# Patient Record
Sex: FEMALE | Race: WHITE | NOT HISPANIC OR LATINO | URBAN - METROPOLITAN AREA
[De-identification: names, ages, dates, MRNs, and addresses within clinical notes are randomized per-mention and may not be internally consistent; named-entity substitution may affect disease eponyms.]

---

## 2018-03-10 ENCOUNTER — EMERGENCY (EMERGENCY)
Facility: HOSPITAL | Age: 63
LOS: 1 days | Discharge: HOME | End: 2018-03-10
Attending: EMERGENCY MEDICINE | Admitting: EMERGENCY MEDICINE
Payer: COMMERCIAL

## 2018-03-10 VITALS
SYSTOLIC BLOOD PRESSURE: 125 MMHG | HEART RATE: 62 BPM | RESPIRATION RATE: 20 BRPM | DIASTOLIC BLOOD PRESSURE: 60 MMHG | OXYGEN SATURATION: 100 %

## 2018-03-10 VITALS
DIASTOLIC BLOOD PRESSURE: 79 MMHG | HEART RATE: 58 BPM | RESPIRATION RATE: 18 BRPM | SYSTOLIC BLOOD PRESSURE: 121 MMHG | OXYGEN SATURATION: 98 % | TEMPERATURE: 98 F

## 2018-03-10 LAB
ALBUMIN SERPL ELPH-MCNC: 3.7 G/DL — SIGNIFICANT CHANGE UP (ref 3.3–5)
ALP SERPL-CCNC: 73 U/L — SIGNIFICANT CHANGE UP (ref 40–120)
ALT FLD-CCNC: 18 U/L RC — SIGNIFICANT CHANGE UP (ref 10–45)
ANION GAP SERPL CALC-SCNC: 13 MMOL/L — SIGNIFICANT CHANGE UP (ref 5–17)
AST SERPL-CCNC: 21 U/L — SIGNIFICANT CHANGE UP (ref 10–40)
BASOPHILS # BLD AUTO: 0.1 K/UL — SIGNIFICANT CHANGE UP (ref 0–0.2)
BASOPHILS NFR BLD AUTO: 0.6 % — SIGNIFICANT CHANGE UP (ref 0–2)
BILIRUB SERPL-MCNC: 0.2 MG/DL — SIGNIFICANT CHANGE UP (ref 0.2–1.2)
BUN SERPL-MCNC: 13 MG/DL — SIGNIFICANT CHANGE UP (ref 7–23)
CALCIUM SERPL-MCNC: 8.4 MG/DL — SIGNIFICANT CHANGE UP (ref 8.4–10.5)
CHLORIDE SERPL-SCNC: 104 MMOL/L — SIGNIFICANT CHANGE UP (ref 96–108)
CO2 SERPL-SCNC: 23 MMOL/L — SIGNIFICANT CHANGE UP (ref 22–31)
CREAT SERPL-MCNC: 0.64 MG/DL — SIGNIFICANT CHANGE UP (ref 0.5–1.3)
EOSINOPHIL # BLD AUTO: 0.2 K/UL — SIGNIFICANT CHANGE UP (ref 0–0.5)
EOSINOPHIL NFR BLD AUTO: 1.7 % — SIGNIFICANT CHANGE UP (ref 0–6)
GLUCOSE SERPL-MCNC: 109 MG/DL — HIGH (ref 70–99)
HCT VFR BLD CALC: 38.2 % — SIGNIFICANT CHANGE UP (ref 34.5–45)
HGB BLD-MCNC: 12.6 G/DL — SIGNIFICANT CHANGE UP (ref 11.5–15.5)
LYMPHOCYTES # BLD AUTO: 2.8 K/UL — SIGNIFICANT CHANGE UP (ref 1–3.3)
LYMPHOCYTES # BLD AUTO: 25.1 % — SIGNIFICANT CHANGE UP (ref 13–44)
MCHC RBC-ENTMCNC: 32.7 PG — SIGNIFICANT CHANGE UP (ref 27–34)
MCHC RBC-ENTMCNC: 32.8 GM/DL — SIGNIFICANT CHANGE UP (ref 32–36)
MCV RBC AUTO: 99.5 FL — SIGNIFICANT CHANGE UP (ref 80–100)
MONOCYTES # BLD AUTO: 0.8 K/UL — SIGNIFICANT CHANGE UP (ref 0–0.9)
MONOCYTES NFR BLD AUTO: 6.9 % — SIGNIFICANT CHANGE UP (ref 2–14)
NEUTROPHILS # BLD AUTO: 7.4 K/UL — SIGNIFICANT CHANGE UP (ref 1.8–7.4)
NEUTROPHILS NFR BLD AUTO: 65.7 % — SIGNIFICANT CHANGE UP (ref 43–77)
PLATELET # BLD AUTO: 258 K/UL — SIGNIFICANT CHANGE UP (ref 150–400)
POTASSIUM SERPL-MCNC: 3.8 MMOL/L — SIGNIFICANT CHANGE UP (ref 3.5–5.3)
POTASSIUM SERPL-SCNC: 3.8 MMOL/L — SIGNIFICANT CHANGE UP (ref 3.5–5.3)
PROT SERPL-MCNC: 6.4 G/DL — SIGNIFICANT CHANGE UP (ref 6–8.3)
RBC # BLD: 3.84 M/UL — SIGNIFICANT CHANGE UP (ref 3.8–5.2)
RBC # FLD: 11.6 % — SIGNIFICANT CHANGE UP (ref 10.3–14.5)
SODIUM SERPL-SCNC: 140 MMOL/L — SIGNIFICANT CHANGE UP (ref 135–145)
WBC # BLD: 11.3 K/UL — HIGH (ref 3.8–10.5)
WBC # FLD AUTO: 11.3 K/UL — HIGH (ref 3.8–10.5)

## 2018-03-10 PROCEDURE — 93005 ELECTROCARDIOGRAM TRACING: CPT

## 2018-03-10 PROCEDURE — 73110 X-RAY EXAM OF WRIST: CPT

## 2018-03-10 PROCEDURE — 85027 COMPLETE CBC AUTOMATED: CPT

## 2018-03-10 PROCEDURE — 99284 EMERGENCY DEPT VISIT MOD MDM: CPT | Mod: 25

## 2018-03-10 PROCEDURE — 80053 COMPREHEN METABOLIC PANEL: CPT

## 2018-03-10 PROCEDURE — 73110 X-RAY EXAM OF WRIST: CPT | Mod: 26,RT

## 2018-03-10 PROCEDURE — 93010 ELECTROCARDIOGRAM REPORT: CPT

## 2018-03-10 RX ORDER — SODIUM CHLORIDE 9 MG/ML
1000 INJECTION INTRAMUSCULAR; INTRAVENOUS; SUBCUTANEOUS ONCE
Qty: 0 | Refills: 0 | Status: COMPLETED | OUTPATIENT
Start: 2018-03-10 | End: 2018-03-10

## 2018-03-10 RX ADMIN — SODIUM CHLORIDE 1000 MILLILITER(S): 9 INJECTION INTRAMUSCULAR; INTRAVENOUS; SUBCUTANEOUS at 18:32

## 2018-03-10 NOTE — ED PROVIDER NOTE - PLAN OF CARE
1. Stay hydrated. Follow up with PCP within 24-48 hours.   2. Rest. Apply cold pack for 20 minutes multiple times daily. Elevate. Keep splint clean, dry, and in place.  3. Return to ER for new or worsened symptoms including severe pain, swelling, numbness/tingling, bluish discoloration, weakness,  or any concern.

## 2018-03-10 NOTE — ED PROVIDER NOTE - CARE PLAN
Principal Discharge DX:	Vasovagal syncope  Assessment and plan of treatment:	1. Stay hydrated. Follow up with PCP within 24-48 hours.   2. Rest. Apply cold pack for 20 minutes multiple times daily. Elevate. Keep splint clean, dry, and in place.  3. Return to ER for new or worsened symptoms including severe pain, swelling, numbness/tingling, bluish discoloration, weakness,  or any concern.

## 2018-03-10 NOTE — ED ADULT NURSE NOTE - OBJECTIVE STATEMENT
62 year old female presented to ED via EMS from Urgent Care with c/o of seizure activity. Pt went to Yoga class today and slipped and landed on right wrist. Pt went to Urgent Care to repair wrist and had 1 episode of focal seizure for 1 minute. EMS was called, 500cc bolus NS given via route, 18G placed in left hand via EMS. Pt denies CP, SOB, nausea/vomiting, numbness/tingling, fever, cough, chills, dizziness, headache. Pt a&ox3, lung sounds clear, heart rate regular, abdomen soft nontender nondistended to palp. Neuro intact. Skin intact. Pt currently resting in bed with family at bedside, side rails up for safety. Will continue to monitor and assess while offering support and reassurance.

## 2018-03-10 NOTE — ED PROVIDER NOTE - OBJECTIVE STATEMENT
63 Yo female Pmhx HLD presents to ED c/o syncope episode 15:30. Pt states 9AM, she was rushing to Yoga class, had mechanical fall placing her hands outward injuring her right wrist. Pt went home, had increasing pain in wrist and was driven to Urgent Care by son. While under examination by physician with palpation of right wrist, patient started to feel faint and syncope x 1 minute. Pt felt weak and lethargic afterwards. Pt right wrist splinted and no XR done. Ambulance was called and transported to ED, uneventful. Denies past h/o seizures, AMS, jerking of limbs, head trauma, CP, SOB, palpitations, diaphoresis.     PCP: Rupinder Lopez  Cardiologist Ezra Mena 63 Yo female Pmhx HLD presents to ED c/o syncope episode 15:30. Pt states 9AM, she was rushing to Yoga class, had mechanical fall placing her hands outward injuring her right wrist. Pt went home, had increasing pain in wrist and was driven to Urgent Care by son. While under examination by physician with palpation of right wrist, patient started to feel faint and syncope x 1 minute. Pt felt weak and lethargic afterwards. Pt right wrist splinted and no XR done. Ambulance was called and transported to ED, uneventful. Denies past h/o seizures, prodromal episode before syncope event, AMS, jerking of limbs, head trauma, CP, SOB, palpitations, diaphoresis.     PCP: Rupinder Lopez  Cardiologist Ezra Mena

## 2018-03-10 NOTE — ED ADULT NURSE NOTE - CHPI ED SYMPTOMS NEG
no nausea/no blurred vision/no loss of consciousness/no confusion/no weakness/no vomiting/no dizziness/no fever/no numbness/no change in level of consciousness

## 2018-03-11 ENCOUNTER — TRANSCRIPTION ENCOUNTER (OUTPATIENT)
Age: 63
End: 2018-03-11

## 2019-03-26 PROBLEM — Z00.00 ENCOUNTER FOR PREVENTIVE HEALTH EXAMINATION: Status: ACTIVE | Noted: 2019-03-26

## 2019-04-26 ENCOUNTER — APPOINTMENT (OUTPATIENT)
Dept: ENDOCRINOLOGY | Facility: CLINIC | Age: 64
End: 2019-04-26

## 2019-08-07 ENCOUNTER — APPOINTMENT (OUTPATIENT)
Dept: ENDOCRINOLOGY | Facility: CLINIC | Age: 64
End: 2019-08-07

## 2021-02-10 ENCOUNTER — NON-APPOINTMENT (OUTPATIENT)
Age: 66
End: 2021-02-10

## 2021-02-10 ENCOUNTER — APPOINTMENT (OUTPATIENT)
Dept: CARDIOLOGY | Facility: CLINIC | Age: 66
End: 2021-02-10
Payer: MEDICAID

## 2021-02-10 VITALS
DIASTOLIC BLOOD PRESSURE: 72 MMHG | HEIGHT: 60 IN | TEMPERATURE: 98.3 F | HEART RATE: 66 BPM | BODY MASS INDEX: 25.52 KG/M2 | SYSTOLIC BLOOD PRESSURE: 118 MMHG | OXYGEN SATURATION: 97 % | WEIGHT: 130 LBS

## 2021-02-10 DIAGNOSIS — I10 ESSENTIAL (PRIMARY) HYPERTENSION: ICD-10-CM

## 2021-02-10 DIAGNOSIS — Z78.9 OTHER SPECIFIED HEALTH STATUS: ICD-10-CM

## 2021-02-10 DIAGNOSIS — I65.29 OCCLUSION AND STENOSIS OF UNSPECIFIED CAROTID ARTERY: ICD-10-CM

## 2021-02-10 DIAGNOSIS — R53.83 OTHER FATIGUE: ICD-10-CM

## 2021-02-10 DIAGNOSIS — Z12.83 ENCOUNTER FOR SCREENING FOR MALIGNANT NEOPLASM OF SKIN: ICD-10-CM

## 2021-02-10 DIAGNOSIS — Z56.0 UNEMPLOYMENT, UNSPECIFIED: ICD-10-CM

## 2021-02-10 DIAGNOSIS — Z60.2 PROBLEMS RELATED TO LIVING ALONE: ICD-10-CM

## 2021-02-10 DIAGNOSIS — Z63.4 DISAPPEARANCE AND DEATH OF FAMILY MEMBER: ICD-10-CM

## 2021-02-10 DIAGNOSIS — Z82.3 FAMILY HISTORY OF STROKE: ICD-10-CM

## 2021-02-10 PROCEDURE — 99072 ADDL SUPL MATRL&STAF TM PHE: CPT

## 2021-02-10 PROCEDURE — 99213 OFFICE O/P EST LOW 20 MIN: CPT

## 2021-02-10 PROCEDURE — 93000 ELECTROCARDIOGRAM COMPLETE: CPT

## 2021-02-10 RX ORDER — OLMESARTAN MEDOXOMIL 20 MG/1
20 TABLET, FILM COATED ORAL
Qty: 90 | Refills: 1 | Status: ACTIVE | COMMUNITY
Start: 2021-02-10

## 2021-02-10 SDOH — SOCIAL STABILITY - SOCIAL INSECURITY: PROBLEMS RELATED TO LIVING ALONE: Z60.2

## 2021-02-10 SDOH — SOCIAL STABILITY - SOCIAL INSECURITY: DISSAPEARANCE AND DEATH OF FAMILY MEMBER: Z63.4

## 2021-02-10 SDOH — ECONOMIC STABILITY - INCOME SECURITY: UNEMPLOYMENT, UNSPECIFIED: Z56.0

## 2021-02-10 NOTE — HISTORY OF PRESENT ILLNESS
[FreeTextEntry1] : This is a 65 year old lady with a PMH of HTN and HLD presents today for cardiac evaluation and to establish care at our office. Patient states that she is feeling good and has no complaints at this time. Patient states that she suffers from Insomnia, however, she has addressed with PMD and states that it has improved.

## 2021-02-10 NOTE — PHYSICAL EXAM
[General Appearance - Well Developed] : well developed [Normal Appearance] : normal appearance [Well Groomed] : well groomed [General Appearance - Well Nourished] : well nourished [No Deformities] : no deformities [General Appearance - In No Acute Distress] : no acute distress [Normal Conjunctiva] : the conjunctiva exhibited no abnormalities [Eyelids - No Xanthelasma] : the eyelids demonstrated no xanthelasmas [Normal Oral Mucosa] : normal oral mucosa [No Oral Pallor] : no oral pallor [No Oral Cyanosis] : no oral cyanosis [Normal Jugular Venous A Waves Present] : normal jugular venous A waves present [Normal Jugular Venous V Waves Present] : normal jugular venous V waves present [No Jugular Venous Davis A Waves] : no jugular venous davis A waves [Heart Rate And Rhythm] : heart rate and rhythm were normal [Heart Sounds] : normal S1 and S2 [Respiration, Rhythm And Depth] : normal respiratory rhythm and effort [Exaggerated Use Of Accessory Muscles For Inspiration] : no accessory muscle use [Auscultation Breath Sounds / Voice Sounds] : lungs were clear to auscultation bilaterally [Abdomen Soft] : soft [Abdomen Tenderness] : non-tender [Abdomen Mass (___ Cm)] : no abdominal mass palpated [Abnormal Walk] : normal gait [Gait - Sufficient For Exercise Testing] : the gait was sufficient for exercise testing [Nail Clubbing] : no clubbing of the fingernails [Cyanosis, Localized] : no localized cyanosis [Petechial Hemorrhages (___cm)] : no petechial hemorrhages [Skin Color & Pigmentation] : normal skin color and pigmentation [] : no rash [No Venous Stasis] : no venous stasis [Skin Lesions] : no skin lesions [No Skin Ulcers] : no skin ulcer [No Xanthoma] : no  xanthoma was observed [Oriented To Time, Place, And Person] : oriented to person, place, and time [Affect] : the affect was normal [Mood] : the mood was normal [No Anxiety] : not feeling anxious [FreeTextEntry1] : 1/6 systolic murmur llsb

## 2021-02-25 ENCOUNTER — TRANSCRIPTION ENCOUNTER (OUTPATIENT)
Age: 66
End: 2021-02-25

## 2021-02-25 ENCOUNTER — APPOINTMENT (OUTPATIENT)
Dept: CARDIOLOGY | Facility: CLINIC | Age: 66
End: 2021-02-25
Payer: MEDICAID

## 2021-02-25 PROCEDURE — 99072 ADDL SUPL MATRL&STAF TM PHE: CPT

## 2021-02-25 PROCEDURE — 93880 EXTRACRANIAL BILAT STUDY: CPT

## 2021-02-25 PROCEDURE — 93306 TTE W/DOPPLER COMPLETE: CPT

## 2021-09-01 ENCOUNTER — NON-APPOINTMENT (OUTPATIENT)
Age: 66
End: 2021-09-01

## 2021-09-01 ENCOUNTER — APPOINTMENT (OUTPATIENT)
Dept: CARDIOLOGY | Facility: CLINIC | Age: 66
End: 2021-09-01
Payer: MEDICAID

## 2021-09-01 VITALS
WEIGHT: 129 LBS | HEART RATE: 68 BPM | SYSTOLIC BLOOD PRESSURE: 120 MMHG | OXYGEN SATURATION: 98 % | TEMPERATURE: 98.2 F | BODY MASS INDEX: 25.32 KG/M2 | DIASTOLIC BLOOD PRESSURE: 80 MMHG | HEIGHT: 60 IN

## 2021-09-01 DIAGNOSIS — Z71.89 OTHER SPECIFIED COUNSELING: ICD-10-CM

## 2021-09-01 DIAGNOSIS — I51.89 OTHER ILL-DEFINED HEART DISEASES: ICD-10-CM

## 2021-09-01 PROCEDURE — 93000 ELECTROCARDIOGRAM COMPLETE: CPT

## 2021-09-01 PROCEDURE — 99214 OFFICE O/P EST MOD 30 MIN: CPT

## 2021-09-01 NOTE — HISTORY OF PRESENT ILLNESS
[FreeTextEntry1] : pt presents  for f/u pt refered by pmd as pt noted to have cardiac mass on ct chest of unclear etiology .questionablrefat vs other .pt also with increased pericardial fluid .pt feels well pt with htn /dyslipidemia pt denies any chest  pain dizziness ,lightheadedness ,nausea vomiting diaphoresis\par

## 2021-09-15 ENCOUNTER — APPOINTMENT (OUTPATIENT)
Dept: CARDIOLOGY | Facility: CLINIC | Age: 66
End: 2021-09-15
Payer: MEDICAID

## 2021-09-15 DIAGNOSIS — R06.00 DYSPNEA, UNSPECIFIED: ICD-10-CM

## 2021-09-15 PROCEDURE — 93306 TTE W/DOPPLER COMPLETE: CPT

## 2021-10-05 ENCOUNTER — APPOINTMENT (OUTPATIENT)
Dept: CARDIOLOGY | Facility: CLINIC | Age: 66
End: 2021-10-05
Payer: MEDICAID

## 2021-10-05 DIAGNOSIS — R94.31 ABNORMAL ELECTROCARDIOGRAM [ECG] [EKG]: ICD-10-CM

## 2021-10-05 PROCEDURE — 93015 CV STRESS TEST SUPVJ I&R: CPT | Mod: 59

## 2021-10-05 PROCEDURE — 93015 CV STRESS TEST SUPVJ I&R: CPT

## 2021-10-05 PROCEDURE — A9500: CPT

## 2021-10-05 PROCEDURE — 78452 HT MUSCLE IMAGE SPECT MULT: CPT

## 2021-10-21 ENCOUNTER — NON-APPOINTMENT (OUTPATIENT)
Age: 66
End: 2021-10-21

## 2021-10-21 LAB
ALBUMIN SERPL ELPH-MCNC: 4.5 G/DL
ALP BLD-CCNC: 69 U/L
ALT SERPL-CCNC: 26 U/L
ANION GAP SERPL CALC-SCNC: 10 MMOL/L
AST SERPL-CCNC: 29 U/L
BASOPHILS # BLD AUTO: 0.06 K/UL
BASOPHILS NFR BLD AUTO: 1 %
BILIRUB SERPL-MCNC: 0.3 MG/DL
BUN SERPL-MCNC: 14 MG/DL
CALCIUM SERPL-MCNC: 9.8 MG/DL
CHLORIDE SERPL-SCNC: 102 MMOL/L
CO2 SERPL-SCNC: 28 MMOL/L
COVID-19 SPIKE DOMAIN ANTIBODY INTERPRETATION: POSITIVE
CREAT SERPL-MCNC: 0.63 MG/DL
EOSINOPHIL # BLD AUTO: 0.11 K/UL
EOSINOPHIL NFR BLD AUTO: 1.8 %
GLUCOSE SERPL-MCNC: 78 MG/DL
HCT VFR BLD CALC: 42.7 %
HGB BLD-MCNC: 13.5 G/DL
IMM GRANULOCYTES NFR BLD AUTO: 0.2 %
LYMPHOCYTES # BLD AUTO: 2.62 K/UL
LYMPHOCYTES NFR BLD AUTO: 42.5 %
MAN DIFF?: NORMAL
MCHC RBC-ENTMCNC: 31.6 GM/DL
MCHC RBC-ENTMCNC: 31.6 PG
MCV RBC AUTO: 100 FL
MONOCYTES # BLD AUTO: 0.66 K/UL
MONOCYTES NFR BLD AUTO: 10.7 %
NEUTROPHILS # BLD AUTO: 2.71 K/UL
NEUTROPHILS NFR BLD AUTO: 43.8 %
PLATELET # BLD AUTO: 274 K/UL
POTASSIUM SERPL-SCNC: 4.7 MMOL/L
PROT SERPL-MCNC: 6.9 G/DL
RBC # BLD: 4.27 M/UL
RBC # FLD: 13.1 %
SARS-COV-2 AB SERPL IA-ACNC: 209 U/ML
SODIUM SERPL-SCNC: 141 MMOL/L
WBC # FLD AUTO: 6.17 K/UL

## 2021-12-07 ENCOUNTER — APPOINTMENT (OUTPATIENT)
Dept: MRI IMAGING | Facility: CLINIC | Age: 66
End: 2021-12-07
Payer: MEDICAID

## 2021-12-07 ENCOUNTER — OUTPATIENT (OUTPATIENT)
Dept: OUTPATIENT SERVICES | Facility: HOSPITAL | Age: 66
LOS: 1 days | End: 2021-12-07
Payer: MEDICAID

## 2021-12-07 DIAGNOSIS — I51.89 OTHER ILL-DEFINED HEART DISEASES: ICD-10-CM

## 2021-12-07 PROCEDURE — 75561 CARDIAC MRI FOR MORPH W/DYE: CPT | Mod: 26,76

## 2021-12-07 PROCEDURE — 75561 CARDIAC MRI FOR MORPH W/DYE: CPT

## 2021-12-07 PROCEDURE — A9585: CPT

## 2021-12-09 ENCOUNTER — NON-APPOINTMENT (OUTPATIENT)
Age: 66
End: 2021-12-09

## 2021-12-15 ENCOUNTER — APPOINTMENT (OUTPATIENT)
Dept: CARDIOLOGY | Facility: CLINIC | Age: 66
End: 2021-12-15

## 2021-12-15 ENCOUNTER — OUTPATIENT (OUTPATIENT)
Dept: OUTPATIENT SERVICES | Facility: HOSPITAL | Age: 66
LOS: 1 days | End: 2021-12-15
Payer: MEDICAID

## 2021-12-15 DIAGNOSIS — I51.89 OTHER ILL-DEFINED HEART DISEASES: ICD-10-CM

## 2021-12-15 PROCEDURE — 75574 CT ANGIO HRT W/3D IMAGE: CPT | Mod: 26

## 2021-12-15 PROCEDURE — 75574 CT ANGIO HRT W/3D IMAGE: CPT

## 2021-12-19 ENCOUNTER — NON-APPOINTMENT (OUTPATIENT)
Age: 66
End: 2021-12-19

## 2022-02-10 ENCOUNTER — APPOINTMENT (OUTPATIENT)
Dept: CARDIOLOGY | Facility: CLINIC | Age: 67
End: 2022-02-10

## 2022-03-21 ENCOUNTER — NON-APPOINTMENT (OUTPATIENT)
Age: 67
End: 2022-03-21

## 2022-03-21 ENCOUNTER — APPOINTMENT (OUTPATIENT)
Dept: CARDIOLOGY | Facility: CLINIC | Age: 67
End: 2022-03-21
Payer: MEDICAID

## 2022-03-21 VITALS
HEIGHT: 60 IN | DIASTOLIC BLOOD PRESSURE: 64 MMHG | OXYGEN SATURATION: 98 % | WEIGHT: 129 LBS | HEART RATE: 78 BPM | TEMPERATURE: 97.9 F | SYSTOLIC BLOOD PRESSURE: 128 MMHG | BODY MASS INDEX: 25.32 KG/M2

## 2022-03-21 PROCEDURE — 93000 ELECTROCARDIOGRAM COMPLETE: CPT

## 2022-03-21 PROCEDURE — 99214 OFFICE O/P EST MOD 30 MIN: CPT

## 2022-03-21 RX ORDER — PRAVASTATIN SODIUM 20 MG/1
20 TABLET ORAL
Qty: 90 | Refills: 3 | Status: DISCONTINUED | COMMUNITY
Start: 2021-02-10 | End: 2022-03-21

## 2022-03-21 RX ORDER — ASPIRIN ENTERIC COATED TABLETS 81 MG 81 MG/1
81 TABLET, DELAYED RELEASE ORAL DAILY
Qty: 90 | Refills: 2 | Status: DISCONTINUED | COMMUNITY
Start: 2021-02-10 | End: 2022-03-21

## 2022-03-21 RX ORDER — ASPIRIN 81 MG/1
81 TABLET, FILM COATED ORAL DAILY
Refills: 0 | Status: ACTIVE | COMMUNITY
Start: 2022-03-21

## 2022-03-21 NOTE — HISTORY OF PRESENT ILLNESS
[FreeTextEntry1] : This is a 66 year female with a Pmhx of HTN HLD coronary artery aneurysm who presents to the office for routine follow up\par \par pt reports feeling well. \par pt reports occasional fleeting heart palpitations \par \par Pt denies any CP, SOB, dizziness, abdominal pain N/V/D fever or chills\par

## 2022-03-29 ENCOUNTER — NON-APPOINTMENT (OUTPATIENT)
Age: 67
End: 2022-03-29

## 2022-03-29 LAB
ALBUMIN SERPL ELPH-MCNC: 4.6 G/DL
ALP BLD-CCNC: 72 U/L
ALT SERPL-CCNC: 21 U/L
ANION GAP SERPL CALC-SCNC: 12 MMOL/L
AST SERPL-CCNC: 25 U/L
BASOPHILS # BLD AUTO: 0.05 K/UL
BASOPHILS NFR BLD AUTO: 0.5 %
BILIRUB DIRECT SERPL-MCNC: 0.1 MG/DL
BILIRUB INDIRECT SERPL-MCNC: 0.2 MG/DL
BILIRUB SERPL-MCNC: 0.2 MG/DL
BUN SERPL-MCNC: 14 MG/DL
CALCIUM SERPL-MCNC: 9.7 MG/DL
CHLORIDE SERPL-SCNC: 101 MMOL/L
CHOLEST SERPL-MCNC: 174 MG/DL
CK SERPL-CCNC: 88 U/L
CO2 SERPL-SCNC: 27 MMOL/L
CREAT SERPL-MCNC: 0.66 MG/DL
EGFR: 98 ML/MIN/1.73M2
EOSINOPHIL # BLD AUTO: 0.13 K/UL
EOSINOPHIL NFR BLD AUTO: 1.4 %
GLUCOSE SERPL-MCNC: 87 MG/DL
HCT VFR BLD CALC: 40.4 %
HDLC SERPL-MCNC: 69 MG/DL
HGB BLD-MCNC: 12.9 G/DL
IMM GRANULOCYTES NFR BLD AUTO: 0.2 %
LDLC SERPL CALC-MCNC: 88 MG/DL
LYMPHOCYTES # BLD AUTO: 3.08 K/UL
LYMPHOCYTES NFR BLD AUTO: 33 %
MAN DIFF?: NORMAL
MCHC RBC-ENTMCNC: 31.5 PG
MCHC RBC-ENTMCNC: 31.9 GM/DL
MCV RBC AUTO: 98.8 FL
MONOCYTES # BLD AUTO: 0.92 K/UL
MONOCYTES NFR BLD AUTO: 9.9 %
NEUTROPHILS # BLD AUTO: 5.14 K/UL
NEUTROPHILS NFR BLD AUTO: 55 %
NONHDLC SERPL-MCNC: 105 MG/DL
PLATELET # BLD AUTO: 267 K/UL
POTASSIUM SERPL-SCNC: 4.2 MMOL/L
PROT SERPL-MCNC: 6.4 G/DL
RBC # BLD: 4.09 M/UL
RBC # FLD: 12.9 %
SODIUM SERPL-SCNC: 140 MMOL/L
T4 SERPL-MCNC: 6.5 UG/DL
TRIGL SERPL-MCNC: 86 MG/DL
TSH SERPL-ACNC: 1.67 UIU/ML
WBC # FLD AUTO: 9.34 K/UL

## 2022-03-29 PROCEDURE — 93224 XTRNL ECG REC UP TO 48 HRS: CPT

## 2022-03-30 PROBLEM — R00.2 HEART PALPITATIONS: Status: ACTIVE | Noted: 2022-03-21

## 2022-03-31 ENCOUNTER — APPOINTMENT (OUTPATIENT)
Dept: CARDIOTHORACIC SURGERY | Facility: CLINIC | Age: 67
End: 2022-03-31
Payer: MEDICAID

## 2022-03-31 VITALS
SYSTOLIC BLOOD PRESSURE: 110 MMHG | WEIGHT: 127 LBS | RESPIRATION RATE: 14 BRPM | HEIGHT: 61 IN | DIASTOLIC BLOOD PRESSURE: 68 MMHG | TEMPERATURE: 98.3 F | OXYGEN SATURATION: 96 % | HEART RATE: 74 BPM | BODY MASS INDEX: 23.98 KG/M2

## 2022-03-31 DIAGNOSIS — R00.2 PALPITATIONS: ICD-10-CM

## 2022-03-31 DIAGNOSIS — I31.3 PERICARDIAL EFFUSION (NONINFLAMMATORY): ICD-10-CM

## 2022-03-31 PROCEDURE — 99203 OFFICE O/P NEW LOW 30 MIN: CPT

## 2022-03-31 RX ORDER — ROSUVASTATIN CALCIUM 10 MG/1
10 TABLET, FILM COATED ORAL
Qty: 90 | Refills: 2 | Status: ACTIVE | COMMUNITY
Start: 2022-02-24

## 2022-04-01 NOTE — PHYSICAL EXAM
[General Appearance - Alert] : alert [General Appearance - In No Acute Distress] : in no acute distress [General Appearance - Well Nourished] : well nourished [General Appearance - Well Developed] : well developed [Sclera] : the sclera and conjunctiva were normal [PERRL With Normal Accommodation] : pupils were equal in size, round, and reactive to light [Outer Ear] : the ears and nose were normal in appearance [Hearing Threshold Finger Rub Not Kinney] : hearing was normal [Both Tympanic Membranes Were Examined] : both tympanic membranes were normal [Neck Appearance] : the appearance of the neck was normal [Respiration, Rhythm And Depth] : normal respiratory rhythm and effort [] : no respiratory distress [Auscultation Breath Sounds / Voice Sounds] : lungs were clear to auscultation bilaterally [Apical Impulse] : the apical impulse was normal [Heart Rate And Rhythm] : heart rate was normal and rhythm regular [Heart Sounds] : normal S1 and S2 [Murmurs] : no murmurs [Examination Of The Chest] : the chest was normal in appearance [2+] : left 2+ [Breast Appearance] : normal in appearance [Bowel Sounds] : normal bowel sounds [Abdomen Soft] : soft [No CVA Tenderness] : no ~M costovertebral angle tenderness [Abnormal Walk] : normal gait [Involuntary Movements] : no involuntary movements were seen [Skin Color & Pigmentation] : normal skin color and pigmentation [Skin Turgor] : normal skin turgor [No Focal Deficits] : no focal deficits [Oriented To Time, Place, And Person] : oriented to person, place, and time [Impaired Insight] : insight and judgment were intact [Affect] : the affect was normal [Mood] : the mood was normal [Memory Recent] : recent memory was not impaired [Memory Remote] : remote memory was not impaired [FreeTextEntry1] : Deferred

## 2022-04-01 NOTE — REVIEW OF SYSTEMS
[Chest Pain] : chest pain [Palpitations] : palpitations [Dizziness] : dizziness [Negative] : Heme/Lymph [Lower Ext Edema] : no lower extremity edema [Fainting] : no fainting

## 2022-04-01 NOTE — END OF VISIT
[FreeTextEntry3] : \par I personally scribed for FRANCINE ANTONIO on Mar 31 2022 11:00AM . \par \par \par \par \par Physician Attestation:\par Documented by LINCOLN DENNISON acting as a scribe for FRANCINE ANTONIO 03/31/2022 . \par                 All medical record entries made by the Scribe were at my, FRANCINE ANTONIO , direction and personally dictated by me on 03/31/2022 . I have reviewed the chart and agree that the record accurately reflects my personal performance of the history, physical exam, assessment and plan\par \par

## 2022-04-01 NOTE — HISTORY OF PRESENT ILLNESS
[FreeTextEntry1] : Ms. GUERRA is a 66 year old female with  past medical history of Hypertension, Hyperlipidemia and Coronary Artery aneurysm with complaints of occasional palpitations, sporadic chest pain 5 minutes and goes away by itself and dizziness . She attributes her chest pain to anxiety. Her PCP referred to  ( cardiologist) for incidental finding of cardiac mass in CT chest. 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 The aneurysm corresponds to the abnormality shown on the recent MRI.The inflow to the aneurysm likely arises from the first septal division, and the outflow from the aneurysm communicates with a small vessel terminating with flow into the pulmonary artery. She is very physically active, tango dancing once in a while, Yoga daily, walking 4 -5 miles daily without any difficulties. He is referred by Dr.Jeffery Mena for initial evaluation and management for Coronary artery aneurysm. Denies any shortness of breath, syncope, PND or pedal edema. \par \par COVID Vaccine: J and J booster in Dec 2021 \par

## 2022-04-01 NOTE — CONSULT LETTER
[FreeTextEntry2] : Dr.Jeffrey Mena [FreeTextEntry1] : I had the pleasure of seeing your patient, ALL GUERRA,in my office today. \par \par We take a multidisciplinary team approach to patient care and consider you, the referring physician, an extension of our team. We will maintain an open line of communication with you throughout your patient's treatment course. \par \par She  is being evaluated for Coronary artery aneurysm . I have reviewed all of the patient's medical records and diagnostic images at the time of her  office consultation. I have enclosed a copy for your records. \par \par 12/19/21 CTA heart revealed Total coronary artery calcium score: 0.\par 2.  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 The aneurysm corresponds to the abnormality shown on the recent MRI.\par 3.  The inflow to the aneurysm likely arises from the first septal division, and the outflow from the aneurysm communicates with a small vessel terminating with flow into the pulmonary artery. \par \par 12/7/21 MR cardiac revealed Indeterminate, enhancing 1.1 x 0.8 cm epicardial nodule adjacent to the LAD. Recommend further evaluation with coronary CTA to exclude a vascular etiology.\par \par 9/15/21 TTE revealed LVEF 55 to 60%, Trivial pericardial effusion,Mild bileaflet prolapse, No evidence of AS/MS. Mild to moderate aortic regurgitation.\par \par 10/5/21 Exercise stress test revealed 10 METS exercise capacity. EKG changes :ST depression 1 mm horizontal in leads V4, V5, V6 . All changes resolved by 4:04 minutes of recovery.\par \par I have reviewed the indications for surgery and anatomy of the heart. The patient meets does meet criteria for surgery. I have recommended to follow up in 1 year with repeat CTA chest . \par \par I appreciate the opportunity to care for your patient at the  [facility] based at [place]. If there are any questions or concerns, please call me  at [phone].\par \par Please see my note below. \par \par Sincerely, \par \par \par \par \par Aubrey Green MD\par Director\par [facility]\par Professor \par Cardiovascular & Thoracic Surgery\par [facility]\[employer].\par \par \par [facility]:\par Department of Cardiovascular and Thoracic Surgery\par [address] [place], [place], [address]\par Office: [phone]\par Fax: [phone]\par \par [facility]\par  \par Department of Cardiovascular and Thoracic Surgery\par [address] [place], [place], [address]\par Phone: [phone]\par Office: [phone]\par \par \par \par \par \par

## 2022-04-01 NOTE — DATA REVIEWED
[FreeTextEntry1] : 12/19/21 CTA heart revealed Total coronary artery calcium score: 0.\par 2.  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 The aneurysm corresponds to the abnormality shown on the recent MRI.\par 3.  The inflow to the aneurysm likely arises from the first septal division, and the outflow from the aneurysm communicates with a small vessel terminating with flow into the pulmonary artery. \par \par 12/7/21 MR cardiac revealed Indeterminate, enhancing 1.1 x 0.8 cm epicardial nodule adjacent to the LAD. Recommend further evaluation with coronary CTA to exclude a vascular etiology.\par \par 9/15/21 TTE revealed LVEF 55 to 60%, Trivial pericardial effusion,Mild bileaflet prolapse, No evidence of AS/MS. Mild to moderate aortic regurgitation.\par \par 10/5/21 Exercise stress test revealed 10 METS exercise capacity. EKG changes :ST depression 1 mm horizontal in leads V4, V5, V6 . All changes resolved by 4:04 minutes of recovery.\par \par \par \par

## 2022-04-01 NOTE — ASSESSMENT
[FreeTextEntry1] : Ms. GUERRA is a 66 year old female with  past medical history of Hypertension, Hyperlipidemia and Coronary Artery aneurysm with complaints of occasional palpitations, sporadic chest pain 5 minutes and goes away by itself and dizziness . She attributes her chest pain to anxiety. Her PCP referred to  ( cardiologist) for incidental finding of cardiac mass in CT chest. 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 The aneurysm corresponds to the abnormality shown on the recent MRI.The inflow to the aneurysm likely arises from the first septal division, and the outflow from the aneurysm communicates with a small vessel terminating with flow into the pulmonary artery. She is very physically active, tango dancing once in a while, Yoga daily, walking 4 -5 miles daily without any difficulties. He is referred by Dr.Jeffery Mena for initial evaluation and management for Coronary artery aneurysm. Denies any shortness of breath, syncope, PND or pedal edema. \par \par  reviewed the diagnostic images and explained to the patient. 12/19/21 CTA heart revealed Total coronary artery calcium score: 0.\par 2.  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 The aneurysm corresponds to the abnormality shown on the recent MRI.\par 3.  The inflow to the aneurysm likely arises from the first septal division, and the outflow from the aneurysm communicates with a small vessel terminating with flow into the pulmonary artery. \par \par 12/7/21 MR cardiac revealed Indeterminate, enhancing 1.1 x 0.8 cm epicardial nodule adjacent to the LAD. Recommend further evaluation with coronary CTA to exclude a vascular etiology.\par \par 9/15/21 TTE revealed LVEF 55 to 60%, Trivial pericardial effusion,Mild bileaflet prolapse, No evidence of AS/MS. Mild to moderate aortic regurgitation.\par \par 10/5/21 Exercise stress test revealed 10 METS exercise capacity. EKG changes :ST depression 1 mm horizontal in leads V4, V5, V6 . All changes resolved by 4:04 minutes of recovery.\par \par Plan:\par 1. Follow up with a CTA chest in 1 year (Dec 2022) for coronary aneurysm surveillance \par 2. Follow up with Cardiologist and PCP\par 3.Continue current medication regimen \par 4. 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  There is no obstruction.  She denies chest discomfort.  I see no indication for surgery.  Recommend daily ASA and repeat cardiac CTA in 1 year.

## 2023-01-19 ENCOUNTER — APPOINTMENT (OUTPATIENT)
Dept: CT IMAGING | Facility: CLINIC | Age: 68
End: 2023-01-19

## 2023-01-19 ENCOUNTER — NON-APPOINTMENT (OUTPATIENT)
Age: 68
End: 2023-01-19

## 2023-02-27 ENCOUNTER — OUTPATIENT (OUTPATIENT)
Dept: OUTPATIENT SERVICES | Facility: HOSPITAL | Age: 68
LOS: 1 days | End: 2023-02-27
Payer: SELF-PAY

## 2023-02-27 ENCOUNTER — APPOINTMENT (OUTPATIENT)
Dept: CT IMAGING | Facility: CLINIC | Age: 68
End: 2023-02-27
Payer: MEDICARE

## 2023-02-27 DIAGNOSIS — I25.41 CORONARY ARTERY ANEURYSM: ICD-10-CM

## 2023-02-27 PROCEDURE — 75574 CT ANGIO HRT W/3D IMAGE: CPT | Mod: 26

## 2023-02-27 PROCEDURE — 75574 CT ANGIO HRT W/3D IMAGE: CPT

## 2023-03-01 DIAGNOSIS — R93.89 ABNORMAL FINDINGS ON DIAGNOSTIC IMAGING OF OTHER SPECIFIED BODY STRUCTURES: ICD-10-CM

## 2024-04-19 ENCOUNTER — APPOINTMENT (OUTPATIENT)
Dept: CT IMAGING | Facility: CLINIC | Age: 69
End: 2024-04-19

## 2024-05-03 ENCOUNTER — APPOINTMENT (OUTPATIENT)
Dept: CT IMAGING | Facility: CLINIC | Age: 69
End: 2024-05-03

## 2024-05-03 ENCOUNTER — OUTPATIENT (OUTPATIENT)
Dept: OUTPATIENT SERVICES | Facility: HOSPITAL | Age: 69
LOS: 1 days | End: 2024-05-03
Payer: MEDICARE

## 2024-05-03 ENCOUNTER — RESULT REVIEW (OUTPATIENT)
Age: 69
End: 2024-05-03

## 2024-05-03 PROCEDURE — 75574 CT ANGIO HRT W/3D IMAGE: CPT | Mod: 26

## 2024-05-20 ENCOUNTER — APPOINTMENT (OUTPATIENT)
Dept: CARDIOTHORACIC SURGERY | Facility: CLINIC | Age: 69
End: 2024-05-20
Payer: MEDICARE

## 2024-05-20 DIAGNOSIS — E78.5 HYPERLIPIDEMIA, UNSPECIFIED: ICD-10-CM

## 2024-05-20 DIAGNOSIS — I25.41 CORONARY ARTERY ANEURYSM: ICD-10-CM

## 2024-05-20 DIAGNOSIS — Z86.79 PERSONAL HISTORY OF OTHER DISEASES OF THE CIRCULATORY SYSTEM: ICD-10-CM

## 2024-05-20 DIAGNOSIS — I10 ESSENTIAL (PRIMARY) HYPERTENSION: ICD-10-CM

## 2024-05-20 PROCEDURE — 99443: CPT

## 2024-05-20 NOTE — DATA REVIEWED
[FreeTextEntry1] :  5/3/24 CTA Heart with coronaries revealed  Cardiac: 1.  The calcium score is 0 Agatston units. 2.  There is fistulous vessel arising from proximal LAD-septal branch with likely communication to the main pulmonary artery. There is an aneurysmal portion of this vessel; on the axial view the maximum dimension is 15.2 x 12.6 mm, on orthogonal view the maximal dimension 13 x 12mm, length of approximately 17 mm. 3.  Remaining coronary segments appear non-obstructive Non-cardiac: 1. No significant findings.  12/19/21 CTA heart revealed Total coronary artery calcium score: 0. 2.  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 The aneurysm corresponds to the abnormality shown on the recent MRI. 3.  The inflow to the aneurysm likely arises from the first septal division, and the outflow from the aneurysm communicates with a small vessel terminating with flow into the pulmonary artery.   12/7/21 MR cardiac revealed Indeterminate, enhancing 1.1 x 0.8 cm epicardial nodule adjacent to the LAD. Recommend further evaluation with coronary CTA to exclude a vascular etiology.  9/15/21 TTE revealed LVEF 55 to 60%, Trivial pericardial effusion,Mild bileaflet prolapse, No evidence of AS/MS. Mild to moderate aortic regurgitation.  10/5/21 Exercise stress test revealed 10 METS exercise capacity. EKG changes :ST depression 1 mm horizontal in leads V4, V5, V6 . All changes resolved by 4:04 minutes of recovery.

## 2024-05-20 NOTE — CONSULT LETTER
[Dear  ___] : Dear  [unfilled], [Courtesy Letter:] : I had the pleasure of seeing your patient, [unfilled], in my office today. [Please see my note below.] : Please see my note below. [Consult Closing:] : Thank you very much for allowing me to participate in the care of this patient.  If you have any questions, please do not hesitate to contact me. [Sincerely,] : Sincerely, [FreeTextEntry2] : Dr.Jeffrey Mena [FreeTextEntry3] : Aubrey Green MD  Department of cardiovascular and Thoracic surgery Professor Department of surgery [facility]

## 2024-05-20 NOTE — HISTORY OF PRESENT ILLNESS
[FreeTextEntry1] : Ms. GUERRA is a 68 year old female with  past medical history of Hypertension, Hyperlipidemia and known Coronary Artery aneurysm since 2022. She is been followed in aortic registry with yearly CT scan. She  was referred by Dr. Ezra Mena. He is here for TELEPHONIC  follow up visit with recent imaging.  Today she presents and reports that she is doing well.  Denies any chest pain, shortness of breath, syncope, PND or pedal edema.

## 2024-05-20 NOTE — REASON FOR VISIT
[Home] : at home, [unfilled] , at the time of the visit. [Medical Office: ([facility])___] : at the medical office located in  [Verbal consent obtained from patient] : the patient, [unfilled] [Follow-Up: _____] : a [unfilled] follow-up visit

## 2024-05-20 NOTE — ASSESSMENT
[FreeTextEntry1] : Ms. GUERRA is a 68 year old female with  past medical history of Hypertension, Hyperlipidemia and known Coronary Artery aneurysm since 2022. She is been followed in aortic registry with yearly CT scan. She  was referred by Dr. Ezra Mena. He is here for TELEPHONIC  follow up visit with recent imaging.  Today she presents and reports that she is doing well.  Denies any chest pain, shortness of breath, syncope, PND or pedal edema.   5/3/24 CTA Heart with coronaries revealed  Cardiac: 1.  The calcium score is 0 Agatston units. 2.  There is fistulous vessel arising from proximal LAD-septal branch with likely communication to the main pulmonary artery. There is an aneurysmal portion of this vessel; on the axial view the maximum dimension is 15.2 x 12.6 mm, on orthogonal view the maximal dimension 13 x 12mm, length of approximately 17 mm. 3.  Remaining coronary segments appear non-obstructive Non-cardiac: 1. No significant findings.  Plan: 1. Follow up with a CTA heart with coronaries for coronary aneurysm surveillance in one year  2. Follow up with Cardiologist and PCP 3.Continue current medication regimen  4. May return to clinic on PRN basis

## 2025-06-20 ENCOUNTER — APPOINTMENT (OUTPATIENT)
Dept: CT IMAGING | Facility: CLINIC | Age: 70
End: 2025-06-20

## 2025-06-20 PROCEDURE — 75574 CT ANGIO HRT W/3D IMAGE: CPT

## 2025-07-01 ENCOUNTER — LABORATORY RESULT (OUTPATIENT)
Age: 70
End: 2025-07-01

## 2025-07-01 ENCOUNTER — APPOINTMENT (OUTPATIENT)
Dept: CARDIOLOGY | Facility: CLINIC | Age: 70
End: 2025-07-01
Payer: MEDICARE

## 2025-07-01 VITALS
OXYGEN SATURATION: 98 % | DIASTOLIC BLOOD PRESSURE: 74 MMHG | RESPIRATION RATE: 15 BRPM | BODY MASS INDEX: 24.94 KG/M2 | SYSTOLIC BLOOD PRESSURE: 138 MMHG | HEART RATE: 66 BPM | WEIGHT: 132 LBS

## 2025-07-01 PROCEDURE — 93000 ELECTROCARDIOGRAM COMPLETE: CPT

## 2025-07-01 PROCEDURE — 99204 OFFICE O/P NEW MOD 45 MIN: CPT | Mod: 25

## 2025-07-07 ENCOUNTER — APPOINTMENT (OUTPATIENT)
Dept: CARDIOLOGY | Facility: CLINIC | Age: 70
End: 2025-07-07

## 2025-07-07 LAB
25(OH)D3 SERPL-MCNC: 56.6 NG/ML
ALBUMIN SERPL ELPH-MCNC: 4.4 G/DL
ALP BLD-CCNC: 68 U/L
ALT SERPL-CCNC: 35 U/L
AMYLASE/CREAT SERPL: 91 U/L
ANION GAP SERPL CALC-SCNC: 13 MMOL/L
APPEARANCE: CLEAR
AST SERPL-CCNC: 30 U/L
BACTERIA: ABNORMAL /HPF
BASOPHILS # BLD AUTO: 0.07 K/UL
BASOPHILS NFR BLD AUTO: 1.1 %
BILIRUB SERPL-MCNC: 0.4 MG/DL
BILIRUBIN URINE: NEGATIVE
BLOOD URINE: NEGATIVE
BUN SERPL-MCNC: 12 MG/DL
CALCIUM SERPL-MCNC: 10.1 MG/DL
CAST: 7 /LPF
CHLORIDE SERPL-SCNC: 102 MMOL/L
CHOLEST SERPL-MCNC: 222 MG/DL
CK SERPL-CCNC: 110 U/L
CO2 SERPL-SCNC: 25 MMOL/L
COLOR: NORMAL
CREAT SERPL-MCNC: 0.67 MG/DL
EGFRCR SERPLBLD CKD-EPI 2021: 96 ML/MIN/1.73M2
EOSINOPHIL # BLD AUTO: 0.16 K/UL
EOSINOPHIL NFR BLD AUTO: 2.6 %
EPITHELIAL CELLS: >36 /HPF
ESTIMATED AVERAGE GLUCOSE: 105 MG/DL
FERRITIN SERPL-MCNC: 43 NG/ML
FOLATE SERPL-MCNC: 16.3 NG/ML
GLUCOSE QUALITATIVE U: NEGATIVE
GLUCOSE SERPL-MCNC: 87 MG/DL
HBA1C MFR BLD HPLC: 5.3 %
HCT VFR BLD CALC: 40.6 %
HDLC SERPL-MCNC: 76 MG/DL
HGB BLD-MCNC: 12.7 G/DL
HYALINE CASTS: PRESENT
IMM GRANULOCYTES NFR BLD AUTO: 0.2 %
IRON SATN MFR SERPL: 37 %
IRON SERPL-MCNC: 112 UG/DL
KETONES URINE: NEGATIVE
LDLC SERPL-MCNC: 131 MG/DL
LEUKOCYTE ESTERASE URINE: NEGATIVE
LPL SERPL-CCNC: 41 U/L
LYMPHOCYTES # BLD AUTO: 2.52 K/UL
LYMPHOCYTES NFR BLD AUTO: 40.7 %
MAGNESIUM SERPL-MCNC: 2.3 MG/DL
MAN DIFF?: NORMAL
MCHC RBC-ENTMCNC: 30.2 PG
MCHC RBC-ENTMCNC: 31.3 G/DL
MCV RBC AUTO: 96.7 FL
MICROSCOPIC-UA: NORMAL
MONOCYTES # BLD AUTO: 0.59 K/UL
MONOCYTES NFR BLD AUTO: 9.5 %
NEUTROPHILS # BLD AUTO: 2.84 K/UL
NEUTROPHILS NFR BLD AUTO: 45.9 %
NITRITE URINE: NEGATIVE
NONHDLC SERPL-MCNC: 146 MG/DL
PH URINE: 7.5
PHOSPHATE SERPL-MCNC: 3.7 MG/DL
PLATELET # BLD AUTO: 266 K/UL
POTASSIUM SERPL-SCNC: 4.6 MMOL/L
PROT SERPL-MCNC: 6.8 G/DL
PROTEIN URINE: ABNORMAL
RBC # BLD: 4.2 M/UL
RBC # FLD: 13.2 %
RED BLOOD CELLS URINE: 2 /HPF
REVIEW: NORMAL
SODIUM SERPL-SCNC: 141 MMOL/L
SPECIFIC GRAVITY URINE: 1.02
T4 FREE SERPL-MCNC: 1.2 NG/DL
TIBC SERPL-MCNC: 303 UG/DL
TRANSFERRIN SERPL-MCNC: 255 MG/DL
TRIGL SERPL-MCNC: 88 MG/DL
TSH SERPL-ACNC: 1.79 UIU/ML
UIBC SERPL-MCNC: 191 UG/DL
UROBILINOGEN URINE: NORMAL
VIT B12 SERPL-MCNC: 499 PG/ML
WBC # FLD AUTO: 6.19 K/UL
WHITE BLOOD CELLS URINE: 4 /HPF